# Patient Record
Sex: FEMALE | Race: WHITE
[De-identification: names, ages, dates, MRNs, and addresses within clinical notes are randomized per-mention and may not be internally consistent; named-entity substitution may affect disease eponyms.]

---

## 2019-03-27 ENCOUNTER — HOSPITAL ENCOUNTER (OUTPATIENT)
Dept: HOSPITAL 80 - FSGY | Age: 24
Discharge: HOME | End: 2019-03-27
Attending: OBSTETRICS & GYNECOLOGY
Payer: COMMERCIAL

## 2019-03-27 VITALS — SYSTOLIC BLOOD PRESSURE: 127 MMHG | DIASTOLIC BLOOD PRESSURE: 77 MMHG

## 2019-03-27 DIAGNOSIS — N80.5: ICD-10-CM

## 2019-03-27 DIAGNOSIS — N94.6: Primary | ICD-10-CM

## 2019-03-27 DIAGNOSIS — N80.3: ICD-10-CM

## 2019-03-27 DIAGNOSIS — N94.10: ICD-10-CM

## 2019-03-27 PROCEDURE — 0DBP4ZZ EXCISION OF RECTUM, PERCUTANEOUS ENDOSCOPIC APPROACH: ICD-10-PCS | Performed by: OBSTETRICS & GYNECOLOGY

## 2019-03-27 PROCEDURE — 8E0W4CZ ROBOTIC ASSISTED PROCEDURE OF TRUNK REGION, PERCUTANEOUS ENDOSCOPIC APPROACH: ICD-10-PCS | Performed by: OBSTETRICS & GYNECOLOGY

## 2019-03-27 PROCEDURE — 0UBF4ZZ EXCISION OF CUL-DE-SAC, PERCUTANEOUS ENDOSCOPIC APPROACH: ICD-10-PCS | Performed by: OBSTETRICS & GYNECOLOGY

## 2019-03-27 PROCEDURE — 0US24ZZ REPOSITION BILATERAL OVARIES, PERCUTANEOUS ENDOSCOPIC APPROACH: ICD-10-PCS | Performed by: OBSTETRICS & GYNECOLOGY

## 2019-03-27 NOTE — POSTOPPROG
Post Op Note


Date of Operation: 03/27/19


Surgeon: Ansno Tanner


Assistant: Farnaz Kim


Anesthesiologist: Jesika


Anesthesia: GET(General Endotracheal)


Pre-op Diagnosis: Endometriosis


Post-op Diagnosis: Same


Procedure: Robotic excision of endo


Findings: Endo


Inf/Abcess present in the surg proc area at time of surgery?: No


EBL: Minimal


Complications: 





None

## 2019-03-27 NOTE — PDANEPAE
ANE Past Medical History





- Cardiovascular History


Hx Hypertension: No


Hx Arrhythmias: No


Hx Chest Pain: No


Hx Coronary Artery / Peripheral Vascular Disease: No


Hx CHF / Valvular Disease: No


Hx Palpitations: No





- Pulmonary History


Hx COPD: No


Hx Asthma/Reactive Airway Disease: No


Hx Recent Upper Respiratory Infection: No


Hx Oxygen in Use at Home: No


Hx Sleep Apnea: No


Sleep Apnea Screening Result - Last Documented: Negative





- Neurologic History


Hx Cerebrovascular Accident: No


Hx Seizures: No


Hx Dementia: No





- Endocrine History


Hx Diabetes: No





- Renal History


Hx Renal Disorders: No





- Liver History


Hx Hepatic Disorders: No





- Neurological & Psychiatric Hx


Hx Neurological and Psychiatric Disorders: No





- Cancer History


Hx Cancer: No





- Congenital Disorder History


Hx Congenital Disorders: No





- GI History


Hx Gastrointestinal Disorders: No





- Other Health History


Other Health History: ENDOMETRIOSIS





- Chronic Pain History


Chronic Pain: No





- Surgical History


Prior Surgeries: 2018 ENDO EXCISION





ANE Review of Systems


Review of Systems: 








- Exercise capacity


METS (RN): 5 METS





ANE Patient History





- Allergies


Allergies/Adverse Reactions: 








No Known Allergies Allergy (Verified 03/15/19 16:05)


 








- Home Medications


Home Medications: 








Low-Ogestrel-28 Tablet  03/15/19 [Last Taken Unknown]








- NPO status


NPO Since - Liquids (Date): 03/27/19


NPO Since - Liquids (Time): 07:00


NPO Since - Solids (Date): 03/26/19


NPO Since - Solids (Time): 18:00





- Smoking Hx


Smoking Status: Never smoked





- Family Anes Hx


Family Hx Anesthesia Complications: NONE





ANE Labs/Vital Signs





- Vital Signs


Blood Pressure: 120/79


Heart Rate: 87


Respiratory Rate: 16


O2 Sat (%): 97


Height: 157.48 cm


Weight: 83.915 kg





ANE Physical Exam





- Airway


Neck exam: FROM


Mallampati Score: Class 1





- ASA Status


ASA Status: II





ANE Anesthesia Plan


Anesthesia Plan: general endotracheal anesthesia

## 2019-03-27 NOTE — GOP
[f rep st]



                                                                OPERATIVE REPORT





DATE OF OPERATION:  03/27/2019



SURGEON:  Anson Tanner MD



ASSISTANT:  Farnaz Kim CFA



ANESTHESIA:  General.



PREOPERATIVE DIAGNOSIS:  

1.  Dysmenorrhea.

2.  Endometriosis.

3.  Mid cycle pain.



POSTOPERATIVE DIAGNOSIS:  

1.  Dysmenorrhea.

2.  Endometriosis.

3.  Mid cycle pain.



PROCEDURE PERFORMED:  

1.  Robotic excision of endometriosis of posterior cul-de-sac, bilateral pelvic sidewalls.

2.  Bilateral ureterolysis.

3.  Excision of rectal lesion.

4.  Bilateral ovariopexy.



FINDINGS:  



SPECIMENS:  Pelvic peritoneum with endometriosis.



ESTIMATED BLOOD LOSS:  Scant.



DESCRIPTION OF PROCEDURE:  The patient was taken to the operating room and identified.  General anest
hesia was administered and found to be adequate.  She was placed in a lithotomy position and prepared
 and draped in a normal sterile fashion.  A Bo catheter was placed in her bladder.  A Hulka tenacu
lum was placed in the uterus for manipulation. 



An 8 mm infraumbilical incision was made with a scalpel.  A Veress needle, with CO2 gas flowing, was 
advanced into the peritoneal cavity.  The abdomen was then insufflated with carbon dioxide gas.  An 8
 mm trocar, followed by the laparoscope, was then inserted.  The upper abdomen was unremarkable.  The
re was no evidence of endometriosis on either diaphragm, liver, gallbladder, stomach or upper abdomin
al bowel.  Two lateral ports were placed (one on the right and one on the left) under direct visualiz
ation.  She was then placed in a Trendelenburg position and the da Philomena robot docked on the left deepak
e.  The instruments were brought into the abdominal cavity under direct visualization. 



She was found to have endometriosis of the posterior cul-de-sac, the distal rectum, bilateral pelvic 
sidewalls.  The anterior cul-de-sac was free.  The appendix was normal in appearance.  There was no e
ndometriosis on the serosal surface of the uterus nor the ovaries. 



A bilateral ovariopexy was performed by attaching each ovary to the ipsilateral round ligament, near 
the internal inguinal ring, with a 3-0 Vicryl Rapide suture.  The lesions of the distal rectum, which
 extended into the muscularis, were excised.  The entire posterior cul-de-sac peritoneum, from the di
stal rectum to the cervix and laterally to the uterosacral ligaments, was completely excised.  She re
quired a bilateral ureterolysis given the endometriosis overlying both ureters.  The peritoneum at th
e pelvic brims was incised.  The ureters were gently dissected free and lateralized off the overlying
 peritoneum and endometriosis from the pelvic brim down to the uterine arteries.  Once this was accom
plished, the entire pelvic sidewall peritoneum was completely excised. 



The pelvis was then irrigated with sterile saline, and hemostasis was present.  The robot was then un
docked.  The skin was closed with 4-0 Monocryl and surgical adhesive. 



Anesthesia was reversed and the patient taken to the PACU awake and in stable condition.



COMPLICATIONS:  None.



DISPOSITION:  Patient stable to PACU.





Job #:  [id number]